# Patient Record
Sex: FEMALE | Race: WHITE
[De-identification: names, ages, dates, MRNs, and addresses within clinical notes are randomized per-mention and may not be internally consistent; named-entity substitution may affect disease eponyms.]

---

## 2018-12-28 ENCOUNTER — HOSPITAL ENCOUNTER (EMERGENCY)
Dept: HOSPITAL 80 - FED | Age: 68
Discharge: HOME | End: 2018-12-28
Payer: COMMERCIAL

## 2018-12-28 VITALS — SYSTOLIC BLOOD PRESSURE: 125 MMHG | DIASTOLIC BLOOD PRESSURE: 74 MMHG

## 2018-12-28 DIAGNOSIS — J44.1: Primary | ICD-10-CM

## 2018-12-28 DIAGNOSIS — C43.9: ICD-10-CM

## 2018-12-28 DIAGNOSIS — M79.89: ICD-10-CM

## 2018-12-28 NOTE — EDPHY
H & P


Stated Complaint: SOB "retaining fluid" x 3 days -sent from [facility]


Time Seen by Provider: 12/28/18 15:29





- Medical/Surgical History


Hx Asthma: No


Hx Chronic Respiratory Disease: No


Hx Diabetes: No


Hx Cardiac Disease: No


Hx Renal Disease: No


Hx Cirrhosis: No


Hx Alcoholism: No


Hx HIV/AIDS: No


Hx Splenectomy or Spleen Trauma: No


Other PMH: malignant melanoma, lymphadema.  on immunotherapy





- Social History


Smoking Status: Current every day smoker


Constitutional: 


 Initial Vital Signs











Temperature (C)  36.5 C   12/28/18 15:16


 


Heart Rate  79   12/28/18 15:16


 


Respiratory Rate  20   12/28/18 15:16


 


Blood Pressure  100/71   12/28/18 15:16


 


O2 Sat (%)  94   12/28/18 15:16








 











O2 Delivery Mode               Room Air














Allergies/Adverse Reactions: 


 





No Known Allergies Allergy (Unverified 12/28/18 15:14)


 








Home Medications: 














 Medication  Instructions  Recorded


 


Aspirin EC 81 mg (*)  12/28/18


 


Lasix 20 MG (*)  12/28/18


 


Potassium Citrate  12/28/18














Medical Decision Making





- Diagnostics


Imaging Results: 


 Imaging Impressions





Chest X-Ray  12/28/18 15:35


Impression:


1. No acute process.


2. Suspect underlying COPD and mild air trapping.











Imaging: Discussed imaging studies w/ On call Radiologist, I viewed and 

interpreted images myself


ED Course/Re-evaluation: 





CHIEF COMPLAINT: Shortness of breath 





HISTORY OF PRESENT ILLNESS:  


The patient is a 69 y/o female with a history of malignant melanoma (on 

immunotherapy) complaining shortness of breath and leg swelling. Around 1 month 

ago she developed the swelling in her extremities. Per the patient's son, for 

the past 2 weeks the patient has been getting "winded" and short of breath when 

moving short distances. Around the same time that she developed the shortness 

of breath she also began to feel more weak and began falling more. Due to this 

weakness she has used a walker. As her symptoms have not improved she went in 

to see her oncologist, Dr. Carmina Mcdaniels. During hat visit her BNP was 232 and she 

had a normal EKG, she did not have a troponin done at that time. Due to her 

symptoms she was advised to present to the emergency department. No fever, 

headache, chest pain, abdominal pain, urinary or bowel complaints, numbness.





REVIEW OF SYSTEMS:  





A comprehensive 10 system review of systems is otherwise negative aside from 

elements mentioned in the history of present illness and medical decision 

making.





PHYSICAL EXAM:  





HR, BP, O2 Sat, RR.  Temp noted


General Appearance:  Alert, well hydrated, appropriate, and non-toxic appearing.


Head:  Atraumatic without scalp tenderness or obvious injury


Eyes:  Pupils equal, round, reactive to light and accommodation, EOMI, no trauma

, no injection.


Ears:  Clear bilaterally, no perforation, normal landmarks


Nose:  Atraumatic, no rhinorrhea, clear.


Throat:  There is no erythema or exudates, no lesions, normal tonsils, mucus 

membranes moist.


Neck:  Supple, 2+ carotid upstroke, nontender, no lymphadenopathy.


Respiratory: Increased respiratory rate, decreased breath sounds bilaterally, 

bilateral coarse rhonchi. No wheezes.


Cardiovascular:  Regular rate and rhythm, no murmurs, rubs, or gallops. 

Bilateral carotid, radial, dorsalis pedis, and posterior tibial pulses intact. 

Good capillary refill all extremities.


Gastrointestinal:  Abdomen is soft, nontender, non-distended, no masses, no 

rebound, no guarding, no peritoneal signs.


Musculoskeletal: 2+ peripheral edema. Normal active ROM of all extremities, 

atraumatic.


Neurological:  Alert, appropriate, and interactive.  The patient has normal 

DTRs and non-focal cranial nerves, motor, sensory, and cerebellar exam.


Skin:  No rashes, good turgor, no nodules on palpation.





Past medical history: Malignant melanoma (on immunotherapy), lymphedema


Past surgical history: Denies


Family history: Denies


Social history: Son at bedside, lives in [facility]





DIAGNOSTICS/PROCEDURES/CRITICAL CARE TIME:  





Chest x-ray: No acute process. Suspect underlying COPD and mild air trapping.





Bilateral Lower Extremity US: Left groin fluid collection, but no DVT.





DIFFERENTIAL DIAGNOSIS:   


The differential diagnosis for the patient's shortness of breath and hypoxemia 

included but was not limited to pneumonia, myocardial infarction, acute 

mountain sickness, high altitude pulmonary edema, congestive heart failure, and 

pulmonary embolus.





MEDICAL DECISION MAKING:  


The patient is a 69 y/o female with a history of malignant melanoma (on 

immunotherapy) presenting with worsening shortness of breath, weakness, and leg 

swelling onset 2 weeks ago. She had a normal EKG at her oncologist (Dr. Carmina Mcdaniels) and a BNP of 232 today. On exam she has an increased respiratory rate, 

decreased breath sounds bilaterally, coarse rhonchi throughout, and 2+ 

peripheral edema. Labs and chest x-ray ordered.





1659: I reviewed patient's chest x-ray which reveals underlying COPD with mild 

air trapping. She has not indication of CHF based on her labs and imaging 

findings.





1711: Patient is most likely having a COPD exacerbation. She will need to 

follow up with her oncologist.





1725: I consulted with NICHELLE Saavedra regarding this patient. He would like to have 

bilateral leg US's performed.





1727: Reassessed patient and discussed laboratory and imaging findings. I have 

also discussed having the ultrasound's performed which she is comfortable with. 





1818: I consulted with Dr. Robbins, radiologist, regarding patient's US. There 

is a left groin fluid collection, but no DVT.





1820: Reassessed patient and discussed US findings. Return precautions provided

; patient is comfortable with this plan.








- Data Points


Laboratory Results: 


 











  12/28/18 12/28/18





  15:46 15:46


 


Magnesium    1.8 mg/dL mg/dL





    (1.6-2.3) 


 


POC Troponin I  0.00 ng/mL ng/mL  





   (0.00-0.08)  











Point of Care Test Results: 


 Chemistry











  12/28/18





  15:46


 


POC Troponin I  0.00 ng/mL ng/mL





   (0.00-0.08) 














Departure





- Departure


Disposition: Home, Routine, Self-Care


Clinical Impression: 


 Chronic obstructive pulmonary disease with acute exacerbation





Condition: Good


Instructions:  COPD (Chronic Obstructive Pulmonary Disease) (ED), Shortness of 

Breath (ED)


Additional Instructions: 


1. Follow-up with your primary doctor or oncologist within 72 hours.  


2. Return to the Emergency Department for fever, chest pain, shortness of breath

, increasing pain or other worsening of condition.


3. There was an incidental finding of a left groin fluid collection which you 

can follow up with your oncologist.





Referrals: 


Carmina Mcdaniels MD [Medical Doctor] - As per Instructions


Report Scribed for: Zackery Mckeon


Report Scribed by: Tsering Wang


Date of Report: 12/28/18


Time of Report: 15:30

## 2019-01-03 ENCOUNTER — HOSPITAL ENCOUNTER (INPATIENT)
Dept: HOSPITAL 80 - FED | Age: 69
LOS: 1 days | Discharge: ANOTHER HOSPITAL | DRG: 95 | End: 2019-01-04
Attending: INTERNAL MEDICINE | Admitting: INTERNAL MEDICINE
Payer: COMMERCIAL

## 2019-01-03 DIAGNOSIS — Z85.048: ICD-10-CM

## 2019-01-03 DIAGNOSIS — E87.1: ICD-10-CM

## 2019-01-03 DIAGNOSIS — Z85.820: ICD-10-CM

## 2019-01-03 DIAGNOSIS — F17.210: ICD-10-CM

## 2019-01-03 DIAGNOSIS — G61.0: Primary | ICD-10-CM

## 2019-01-03 LAB — PLATELET # BLD: 417 10^3/UL (ref 150–400)

## 2019-01-03 PROCEDURE — A9585 GADOBUTROL INJECTION: HCPCS

## 2019-01-03 NOTE — GHP
DATE OF ADMISSION:  01/03/2019



The patient is a lady with a history of malignant melanoma with metastases on immunotherapy.  This wa
s 1st diagnosed about 6 years ago.  She had about 6 weeks of lower extremity swelling and dyspnea. 



She was seen in the emergency department a few days ago, had ultrasound negative for DVT.  She re-pre
sents today with ongoing weakness.  A few days ago, she was hyponatremic at 128.  She has been on Las
ix.  She eats well. 



She has not had fever, chills, cough, sputum.  She describes her legs as weak and buckling.  There is
 no rash.  She has not had fevers.  She has a firm mass in her left upper inner thigh. 



She says as of 6 weeks ago, she was doing well, thriving, working at [employer], etc. 



I spent some time reviewing the recent chest, abdomen, pelvis CT scan with Radiology, which showed no
 lesions in her lungs, clear lungs.  Her liver looked slightly long, but otherwise unremarkable.  The
re is no intraabdominal fluid collection.  She had a stable seroma in her left inner thigh and there 
was no clear spinal lesion.



PAST MEDICAL HISTORY:  Metastatic melanoma on immunotherapy.



ALLERGIES:  No known drug allergies.



MEDICATIONS:  Furosemide, aspirin, ibuprofen, Mag oxide, potassium.



SOCIAL HISTORY:  She smokes cigarettes.  She drinks rare alcohol.  She works at [employer] in [facility].



FAMILY HISTORY:  Reviewed and unremarkable.



PHYSICAL EXAM:  VITAL SIGNS: Blood pressure 120/71, pulse 70, breathing 14 times a minute, 90% on ty
m air.  Temp 36.7.  GENERAL:  No acute distress. HEENT: Sclerae anicteric.  Oropharynx clear.  Mucous
 membranes moist.  NECK:  Supple without lymphadenopathy or JVD.  LUNGS:  Clear to auscultation bilat
erally.  HEART: S1, S2.  ABDOMEN:  Soft, nontender, nondistended.  LOWER EXTREMITIES:  There is a flu
ctuant mass without warmth or erythema in the left inner thigh.  There is 1+ edema.  NEUROLOGIC: Her 
strength is probably 3/5 in her lower extremities. A little bit weaker on the left.  SKIN: Without ra
sh.



LABS:  White count 14.5, left shift, hematocrit 42, platelets are 417,000.  Sodium 128, potassium 4.5
, chloride 94, bicarb 29, BUN 18, creatinine 0.7, glucose 115.  Troponin 5 days ago was 0.  Imaging s
tudies are as noted.  



Discussed the case with Dr. Joey Nash.



ASSESSMENT/PLAN:  A 68-year-old female with lower extremity edema and weakness.

1.  Edema.  This could be a result of immunotherapy.  She ruled out for deep vein thrombosis just a f
ew days ago.  Further workup will include echocardiogram and albumin.  She does not have ascites to s
uggest liver dysfunction is causing this and imaging of her liver is relatively unremarkable.  It may
 be a paraneoplastic syndrome as well.

2.  Echo and albumin and liver function tests.  I think we can hold off on repeating the ultrasound.

3.  Weakness, this is concerning for spinal pathology, her CT did not show anything or an MRI L-spine
 with contrast.  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  This is not particularly common with metastatic 
melanoma, but I will defer workup of this until the MRI is done.

4.  Hyponatremia.  I suspect this is secondary to Lasix versus syndrome of inappropriate antidiuretic
 hormone.  I will fluid restrict her 2 L and hold her Lasix.

5.  Melanoma.  Immunotherapy is on hold for now. 

6.  Hyponatremia.  This is euvolemic hyponatremia.  I will send a urine os and evaluate for syndrome 
of inappropriate antidiuretic hormone, although it will be a little bit difficult to sort out with th
e Lasix therapy.



DISPOSITION:  Inpatient status. Full code.  PT, OT.





Job #:  [id number]

## 2019-01-03 NOTE — GHP
DATE OF ADMISSION:  01/03/2019



The patient is a pleasant 68-year-old female with a history of metastatic melanoma, who presents with
 lower extremity edema and weakness. 



She has melanoma, which is currently 



DICTATION ENDS





Job #:  [id number]

## 2019-01-03 NOTE — EDPHY
H & P


Time Seen by Provider: 01/03/19 13:42


HPI/ROS: 





CHIEF COMPLAINT:  Bilateral leg weakness





HISTORY OF PRESENT ILLNESS:  Patient was in emergency department 12/28/2018 for 

chief complaint of shortness of breath and retaining fluid.  Diagnosis was COPD 

exacerbation with chest x-ray performed and bilateral ultrasound of legs 

negative for DVT.  She has been having increasing leg swelling and weakness for 

the past 6 weeks.





Over the past 3 days patient's weakness has gotten much worse.  She is falling 

every other day.  She can't walk.  She feels like she has no strength in her 

legs.  Not associated with incontinence, her numbness is also getting worse.  

No difficulty breathing.





REVIEW OF SYSTEMS:


Eye: no change in vision


ENT: no sore throat


Cardiac: no chest pain or syncope


Pulmonary: no cough or SOB


Abdomen: no vomiting, diarrhea, abdominal pain


Musculoskeletal: no back pain


Skin: no rash


Neuro:  HPI


Constitutional: no fever


: no urinary symptoms





A comprehensive 10 point review of systems is otherwise negative aside from 

elements mentioned in the history of present illness.





PAST MEDICAL HISTORY:  ED visit dated 12/28/2018 personally reviewed includes 

malignant melanoma, with lymphedema on immunotherapy.





Social history:  Tobacco smoker





General Appearance: Alert and conversant, cooperative.


Eyes: No scleral  icterus. 


ENT, Mouth: Normal mucous membranes.


Respiratory: Normal respiratory effort, breath sounds equal, lungs are clear to 

auscultation.


Cardiovascular:  Regular rate and rhythm.


Gastrointestinal:  Abdomen is soft and non tender.


Neurological:  Alert, face symmetric, has decreased sensation to both lower 

extremities, no clonus, toes downgoing.  She does not have detectable patellar 

reflexes.  She cannot lift either leg off the bed although she can plantar flex 

and dorsiflex at the ankle with good strength.


Skin: Warm and dry, no rashes.


Musculoskeletal:  There is some groin swelling and induration proximally on the 

left leg but no tenderness and no redness or lymphangitis.


Psychiatric: Not agitated.





Emergency Department course/MDM:





Admission for further evaluation in this patient with melanoma, who now can't 

walk.


Alvin considered; I think that DVT would be unlikely with recent 

negative ultrasound, think cauda equina would be unlikely without back pain and 

some preserved plantar and dorsiflexion with no incontinence.  Does not appear 

to have acute CHF or cellulitis or compartment syndrome or fracture.  Symmetric 

leg weakness would be unlikely to be ischemic or hemorrhagic stroke.  Think 

myelitis would be less likely with 6 week history of symptoms.





[facility] for Dr. Arthur 1401.


[facility] for Dr. Carmina Mcdaniels, Dr. Hargrove 1358.


Smoking Status: Heavy smoker


Constitutional: 


 Initial Vital Signs











Temperature (C)  36.7 C   01/03/19 13:18


 


Heart Rate  80   01/03/19 13:18


 


Respiratory Rate  18   01/03/19 13:18


 


Blood Pressure  99/50 L  01/03/19 13:18


 


O2 Sat (%)  93   01/03/19 13:18








 











O2 Delivery Mode               Room Air














Allergies/Adverse Reactions: 


 





No Known Allergies Allergy (Verified 01/03/19 14:15)


 








Home Medications: 














 Medication  Instructions  Recorded


 


Aspirin [Aspirin 81mg (*)] 81 mg PO DAILY 12/28/18


 


Furosemide [Lasix 40 MG (*)] 40 mg PO DAILY 12/28/18


 


Potassium Cl [Klor-Con 20 meq (*)] 20 meq PO DAILY 12/28/18


 


Ibuprofen [Motrin (*)] 400 mg PO DAILY PRN 01/03/19


 


Magnesium Oxide [Magnesium Oxide 400 mg PO DAILY 01/03/19





400 mg (*)]  














Medical Decision Making





- Data Points


Laboratory Results: 


 Laboratory Results





 01/03/19 13:45 





 01/03/19 13:45 





 











  01/03/19 01/03/19





  13:45 13:45


 


WBC    14.52 10^3/uL H 10^3/uL





    (3.80-9.50) 


 


RBC    4.90 10^6/uL 10^6/uL





    (4.18-5.33) 


 


Hgb    14.5 g/dL g/dL





    (12.6-16.3) 


 


Hct    42.2 % %





    (38.0-47.0) 


 


MCV    86.1 fL fL





    (81.5-99.8) 


 


MCH    29.6 pg pg





    (27.9-34.1) 


 


MCHC    34.4 g/dL g/dL





    (32.4-36.7) 


 


RDW    14.0 % %





    (11.5-15.2) 


 


Plt Count    417 10^3/uL H 10^3/uL





    (150-400) 


 


MPV    8.4 fL L fL





    (8.7-11.7) 


 


Neut % (Auto)    Not Reported 





   


 


Lymph % (Auto)    Not Reported 





   


 


Mono % (Auto)    Not Reported 





   


 


Eos % (Auto)    Not Reported 





   


 


Baso % (Auto)    Not Reported 





   


 


Nucleat RBC Rel Count    Not Reported 





   


 


Absolute Neuts (auto)    Not Reported 





   


 


Absolute Lymphs (auto)    Not Reported 





   


 


Absolute Monos (auto)    Not Reported 





   


 


Absolute Eos (auto)    Not Reported 





   


 


Absolute Basos (auto)    Not Reported 





   


 


Absolute Nucleated RBC    Not Reported 





   


 


Immature Gran %    Not Reported 





   


 


Seg Neutrophils %    64.7 % %





   


 


Band Neutrophils %    2.0 % %





   


 


Lymphocytes %    2.9 % %





   


 


Monocytes %    4.9 % %





   


 


Eosinophils %    24.5 % %





   


 


Basophils %    1.0 % %





   


 


Metamyelocytes %    0.0 % %





   


 


Myelocytes %    0.0 % %





   


 


Promyelocytes %    0.0 % %





   


 


Blast Cells %    0.0 % %





   


 


Immature Gran #    Not Reported 





   


 


Absolute Seg Neuts    9.39 10^3/uL H 10^3/uL





    (1.70-6.50) 


 


Absolute Band Neuts    0.29 10^3/uL 10^3/uL





    (0.00-0.70) 


 


Absolute Lymphocytes    0.42 10^3/uL L 10^3/uL





    (1.00-3.00) 


 


Absolute Monocytes    0.71 10^3/uL 10^3/uL





    (0.30-0.80) 


 


Absolute Eosinophils    3.56 10^3/uL H 10^3/uL





    (0.03-0.40) 


 


Absolute Basophils    0.15 10^3/uL H 10^3/uL





    (0.02-0.10) 


 


Absolute Metamyelocyte    0.00 10^3/mL 10^3/mL





    (0.00-0.00) 


 


Absolute Myelocytes    0.00 10^3/mL 10^3/mL





    (0.00-0.00) 


 


Absolute Promyelocytes    0.00 10^3/uL 10^3/uL





    (0.00-0.00) 


 


Absolute Plasma Cells    0.00 10^3/uL 10^3/uL





    (0.00-0.00) 


 


Nucleated RBCs    0 /100 WBC /100 WBC





    (0-0) 


 


Absolute Blast Cells    0.00 10^3/uL 10^3/uL





    (0.00-0.00) 


 


Plasma Cells %    0.0 % %





   


 


Platelet Estimate    INCREASED  H 





    (ADEQ) 


 


Microcytic Cells    1+  H 





   


 


Sodium  128 mEq/L L mEq/L  





   (135-145)  


 


Potassium  4.5 mEq/L mEq/L  





   (3.5-5.2)  


 


Chloride  94 mEq/L L mEq/L  





   ()  


 


Carbon Dioxide  29 mEq/l mEq/l  





   (22-31)  


 


Anion Gap  5 mEq/L L mEq/L  





   (6-14)  


 


BUN  18 mg/dL mg/dL  





   (7-23)  


 


Creatinine  0.7 mg/dL mg/dL  





   (0.6-1.0)  


 


Estimated GFR  > 60   





   


 


Glucose  115 mg/dL H mg/dL  





   ()  


 


Calcium  9.2 mg/dL mg/dL  





   (8.5-10.4)  














Departure





- Departure


Disposition: [facility] Inpatient Acute


Clinical Impression: 


 Bilateral leg weakness





Condition: Fair

## 2019-01-03 NOTE — ASMTCMCOM
CM Note

 

CM Note                       

Notes:

Chart reviewed. 68 year old female admitted via ED for c/o inability to walk. Normally lives 

independently in [place] with a son who lives nearby. Currently being treated for malignant 

melanoma CM to follow for needs.



Plan: TBD

 

Date Signed:  01/03/2019 03:44 PM

Electronically Signed By:Karen Kwon RN

## 2019-01-04 VITALS — SYSTOLIC BLOOD PRESSURE: 106 MMHG | DIASTOLIC BLOOD PRESSURE: 66 MMHG

## 2019-01-04 LAB
CK SERPL-CCNC: 54 IU/L (ref 0–156)
PLATELET # BLD: 393 10^3/UL (ref 150–400)

## 2019-01-04 NOTE — NEUROPROG
Assessment: 


Epi_1950


  -


Neurology Consult:


-


CC: weakness


-


HPI: 


Pt with metastatic melanoma on immunotherapy (keytruda) admitted to [facility] on 1/3/

19 for bilateral leg swelling and leg weakness.  Pt reported for past six weeks 

she has had progressive leg weakness to the point of inability to walk.  She 

had a lumbar MRI wwo which was unremarkable.  Her neurologic exam showed 

bilateral leg weakness as well as absent reflexes in legs.  This is concerning 

for some form of acute inflammatory demyelinating polyneuropathy.  I feel the 

next diagnostic test she needs is EMG/NCS of legs to look for any acquired 

conduction block.  This test is not available for inpatients at [facility] so I will 

transfer to [facility] for further diagnostic w/u.  Pt in agreement 

with plan.  Case discussed as well with hospitalist.


-


PMHx: metastatic melanoma on immunotherapy


-


SHx: +tobacco


FHx: NC


-


ROS: Pt denied acute fever, total vision loss, active severe chest pain, 

respiratory failure, total body severe rash, total bowel/bladder incontinence, 

psychosis, active seizures, or active bleeding


-


O:


VS reviewed


General: Alert


Eyes: Fundoscopic exam not able to visualize optic disks


CV: Heart RRR, no murmur, no carotid bruit


Lungs: Clear to auscultation bilaterally, no rhonchi or rales


Neuro:


- Mental: 


.     Oriented x person/place/date


.     concentration appears normal


.     speech fluency/comprehension normal


.     memory appears normal


.     fund of knowledge appear intact


- Cranial Nerves:


.     II: PERRL, VFFTC


.     III/IV/VI: EOMI, no nystagmus, normal smooth pursuits, no Ptosis


.     V: facial sensation intact to LT


.     VII: face symmetric to eye closure and smile


.     VIII: hearing intact to conversation


.     IX/X: uvula raises symmetrically


.     XI: SCM 5/5 B/L strength


.     XII: tongue protrudes midline w/nl strength


- Motor: 


.     Tone: normal tone in all 4 extremity


.     Strength: no pronator drift, strength 5/5 throughout arms but bilateral 

leg weakness


- Reflexes: B/L patella/ach 0/4


- Sensory: all 4 extremity intact to light touch


- Coord: finger-to-nose wnl, BONITA wnl


- Gait: deferred


-


Labs:


1/4/19- K 4.4


-


Rads:


1/3/19- Lumbar MRI wwo: Negative MRI examination of the lumbar spine without 

and with contrast (I personally visualized the images on 1/4/19)


-


Assessment:


1. B/L Leg Weakness: Pt with metastatic melanoma on immunotherapy (keytruda) 

admitted to [facility] on 1/3/19 for bilateral leg swelling and leg weakness.  Pt 

reported for past six weeks she has had progressive leg weakness to the point 

of inability to walk.  She had a lumbar MRI wwo which was unremarkable.  Her 

neurologic exam showed bilateral leg weakness as well as absent reflexes in 

legs.  This is concerning for some form of acute inflammatory demyelinating 

polyneuropathy (AIDP or Guillian Braddock Syndrome).  I feel the next diagnostic 

test she needs is EMG/NCS of legs to look for any acquired conduction block.  

This test is not available for inpatients at [facility] so I will transfer to [facility] for further diagnostic w/u.  Pt in agreement with plan.  Case 

discussed as well with hospitalist.


-


Plan:


- Transfer to [facility] for inpatient EMG/NCS and treatment of 

probable AIDP





Objective: 





 Vital Signs











Temp Pulse Resp BP Pulse Ox


 


 36.6 C   79   16   106/66   92 


 


 01/04/19 16:00  01/04/19 16:00  01/04/19 12:42  01/04/19 16:00  01/04/19 16:00








 Laboratory Results





 01/04/19 04:53 





 01/04/19 04:53 





 











 01/03/19 01/04/19 01/05/19





 05:59 05:59 05:59


 


Intake Total  880 500


 


Output Total  1170 1650


 


Balance  -290 -1150











Allergies/Adverse Reactions: 


 





No Known Allergies Allergy (Verified 01/03/19 14:15)

## 2019-01-04 NOTE — PDCONSULT
Consultant Note: 


Patient is a 68-year-old female with a history of stage IIIa (T2AN1A) melanoma 

of the left calf currently on adjuvant nivolumab admitted for lower extremity 

swelling sensory changes weakness.





Patient reports 6 weeks of progressively worsening lower extremity swelling 

paresthesias and weakness.  Appears that the weakness is more pronounced in the 

proximal lower extremities.  Much of her evaluation has centered around the 

lower extremity edema including a negative venous duplex ultrasound.He also had 

an echocardiogram on 1/3/2018 was unremarkable.  On admission to the hospital 

directly from the clinic she had a lumbar spine MRI which was also 

unremarkable. She denies saddle anesthesia. 





She received her 13th cycle of nivolumab on 2018.








Past Medical History:


- Anal Cancer status post Jamir protocol in 


- Melanoma, 1.1 mm 2mm2 mitoses, 1 LN involved 





Past Surgical History:


-Wide local excision with SLN biopsy





Social History:


-50 pack year smoking history, occasional beer, no drugs or marijuana





Family History:


-Father  of colon cancer





Review of Systems: 


-A complete 12 point ROS is negative unless indicated in the HPI





Physical Examination:


VS reviewed


General: no acute distress, non toxic appearing female


HEENT: pupil are equal round and reactive to light, no icterus or pallor


Neck: supple


Cardiovascular: Regular in rate and rhythm without rubs thrills or gallops


Chest: Clear to auscultation and percussion in bilateral posterior lungs


Abdomen: soft nontender non distended without hepatosplenomegaly


Neurologic: 5/5 UE strength bilaterally, CN II-XII intact, LE: 3/5 flexion at 

hips, 4-/5 dorsiflexion, 5/5 plantar flexion at ankles, decreased sensation 

throughtout LEs to light touch most pronounced inner thighs bilaterally, 

proprioception intact LEs, absent patellar and ankle jerks, down going toes to 

babinski, no clonus 





Medications and Allergies Reviewed in MAR





Assessment and Plan:


Patient is a 68-year-old female with history of stage III melanoma of the lower 

extremity admitted for weakness in the lower extremities accompanied by 

paresthesias and edema concerning for a demyelinating process such as Guillain-

Barr.





Problem #1 - lower extremity weakness and sensory changes


Patient's current constellation of symptoms as well as physical examination 

concerning for an acute demyelinating polyneuropathy.  He has both weakness and 

decreased sensation in the lower extremities as well as being hyporeflexic of 

the patella and ankles.  Given that this is likely related to immunotherapy a 

consequence of immunotherapy treatment plan would be to stop immunotherapy 

immediately we will give the patient 1 g of methylprednisolone IV now have her 

evaluated by neurology with consideration of IVIG and plasmapheresis

## 2019-01-04 NOTE — ASMTCMCOM
CM Note

 

CM Note                       

Notes:

Per neurologist Dr. Elizondo, patient to transfer to [facility] for workup for her neurological symptoms 

Charge Rn aware, [place] to call. Dr Elizondo spoke with accepting physician. Dr. Moreno to do [facility] 

form. Nurse also aware of [facility] form. Patient in agreement for transfer.

 

Date Signed:  01/04/2019 05:11 PM

Electronically Signed By:Karen Kwon RN

## 2019-01-04 NOTE — HOSPPROG
Hospitalist Progress Note


Objective: 


 Vital Signs











Temp Pulse Resp BP Pulse Ox


 


 36.6 C   65   16   114/67   97 


 


 01/04/19 12:42  01/04/19 12:42  01/04/19 12:42  01/04/19 12:42  01/04/19 12:42








 Laboratory Results





 01/04/19 04:53 





 01/04/19 04:53 





 











 01/03/19 01/04/19 01/05/19





 05:59 05:59 05:59


 


Intake Total  880 500


 


Output Total  1170 1350


 


Balance  -290 -850














ICD10 Worksheet


Patient Problems: 


 Problems











Problem Status Onset


 


Bilateral leg weakness Acute

## 2019-01-04 NOTE — PDDCSUM
Discharge Summary


Discharge Summary: 





Date of Admission:  January 3, 2019





Date of Transfer:  January 4, 2018





Transfer Diagnoses: 


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


   -b/l leg ascending weakness/paresthesias


St IV melanoma, on immunotherapy


Suspected Medication AE


Peripheral edema - b/l legs


Hypotonic hyponatremia, mild





Admission Diagnoses: 


Edema


Weakness


Hyponatremia


Melanoma





Consultants:


Oncology - Dr. José Hargrove.


Neurology - Dr. Kendall Elizondo.





Hospital Course: 


The pt is a 69yo F w/ PMH St IV melanoma who presented with progressively 

worsening bilateral leg weakness, gait instability, mechanical falls, leg 

swelling, and leg paresthesias. Initial onset of symptoms was nearly 6 weeks ago

, prior to which she reports normal activity/sensation. Symptoms greatly 

worsened within the last 1-2 weeks. She had complained about her symptoms to 

her Oncologist in the outpatient setting, who started her on daily Lasix/

potassium replacement. She had come to the ED 2 days prior to this admission 

and underwent a bilateral LE US which ruled out DVTs. She currently reports 

complete numbness in the distal LLE starting just above the knee and 70% 

numbness in RLE. She has had tingling paresthesias in bilateral feet. 





She underwent some initial workup (see tests below) and both Oncology and 

Neurology were consulted. Patient's symptoms are highly suspicious for a 

peripheral demyelinating syndrome such as Guillain Hartwick syndrome, perhaps 

secondary to immunotherapy or paraneoplastic syndrome. Unfortunately this 

hospital is unable to perform inpatient EMG/NCS testing and has other limited 

resources to care for this type of case. The patient has been accepted for 

transfer to [facility] in [place], were appropriate testing can 

be performed and patient can be followed by more specialized physicians. 





Patient was also noted to be mildly hyponatremic. Initial workup suggests 

hypotonic hyponatremia, perhaps secondary to furosemide use, SIADH associated 

with malignancy/medication AE, or adrenal insufficiency. Furosemide was held. 








Physical Exam: 


General: The patient is a female who is alert and in no acute distress. 


HEENT: normocephalic, extraocular movements intact, conjunctivae clear, no 

lesions on face. Mucous membranes moist.


Neck: trachea midline, no visible masses, no external lesions. 


CV: +S1/S2, RRR, no MRG.


Resp: unlabored, CTAB no RRW.


Abd: soft and nondistended.  


Musculoskeletal:  2/5 strength b/l hip flexors, knee flexion/extension. 3/5 

strength b/l foot ext/flexion. 


Neuro: cranial nerves II - XII grossly intact. 0 (absent) DTR b/l patellar/

Achilles. No clonus. Unable to perform heel to shin test b/l. 


Psych: appropriate mood/affect. 


Skin:  No pallor.


Heme/lymph:  No peripheral edema.





Condition: 


Guarded. Stable for transfer.  





Transferred to: 


[facility] in [facility]





Pertinent tests/labs/imaging:





Bilat LE US: negative for DVT. 


MRI L spine w/ and w/o contrast: normal. 


Labs: see attached. Pending vitamin B12 level, ACTH level. 


Echo: normal, aside from a small RA mass vs likely lipomatous interatrial 

septum. Consider cardiac MRI for better visualization. 





Medications:  


Furosemide 40 mg orally daily, aspirin 81 mg orally daily, ibuprofen 400 mg 

orally daily as needed for pain, magnesium oxide 400 mg orally daily, potassium 

chloride 20 mEq orally daily. Immunotherapy: Keytruda. 





Special instructions: 


Please communicate with the patient's primary Oncologist at the [facility] regarding her condition.





> 30 minutes of total time was spent on counseling and coordination of care for 

this patient's transfer.

## 2019-01-04 NOTE — ASMTCMCOM
CM Note

 

CM Note                       

Notes:

Patient plan of care reviewed in rounds,. 68 year old female living independently and working up 

until 6 weeks ago presents with increasing weakness of her lower extremitites. History significant 

for melanoma. Has supportive son in area. Needs TBD.

plan: TBD

 

Date Signed:  01/04/2019 02:28 PM

Electronically Signed By:Karen Kwon RN

## 2019-01-14 ENCOUNTER — HOSPITAL ENCOUNTER (INPATIENT)
Dept: HOSPITAL 80 - BREH | Age: 69
LOS: 11 days | Discharge: NURSING, REHAB OR LONG-TERM CARE | DRG: 74 | End: 2019-01-25
Attending: INTERNAL MEDICINE | Admitting: INTERNAL MEDICINE
Payer: COMMERCIAL

## 2019-01-14 DIAGNOSIS — R03.0: ICD-10-CM

## 2019-01-14 DIAGNOSIS — I89.0: ICD-10-CM

## 2019-01-14 DIAGNOSIS — R63.4: ICD-10-CM

## 2019-01-14 DIAGNOSIS — Z85.820: ICD-10-CM

## 2019-01-14 DIAGNOSIS — G62.9: Primary | ICD-10-CM

## 2019-01-14 DIAGNOSIS — M62.50: ICD-10-CM

## 2019-01-15 PROCEDURE — F0636ZZ COMMUNICATIVE/COGNITIVE INTEGRATION SKILLS TREATMENT OF NEUROLOGICAL SYSTEM - WHOLE BODY: ICD-10-PCS | Performed by: INTERNAL MEDICINE

## 2019-01-15 PROCEDURE — F07M3ZZ MOTOR FUNCTION TREATMENT OF MUSCULOSKELETAL SYSTEM - WHOLE BODY: ICD-10-PCS | Performed by: INTERNAL MEDICINE

## 2019-01-15 RX ADMIN — ACETAMINOPHEN PRN MG: 325 TABLET ORAL at 21:25

## 2019-01-15 RX ADMIN — ENOXAPARIN SODIUM SCH MG: 100 INJECTION SUBCUTANEOUS at 18:07

## 2019-01-15 NOTE — GHP
DATE OF ADMISSION:  01/15/2019



TIME OF EVALUATION:  1410



REFERRING FACILITY:  [facility].



IMPAIRMENT GROUP:  3.3.



DATE OF ONSET:  01/04/2019



POST ADMISSION PHYSICIAN EVALUATION AND REHABILITATION TREATMENT PLAN



CONSULTING PHYSICIANS:  She had consultation with Neurology, Dr. Chen, and was 
also seen by Oncology.



REHABILITATION DIAGNOSIS:  Debility due to polyneuropathy and leg weakness.



ETIOLOGIC DIAGNOSIS:  Polyneuropathy.



HISTORY OF PRESENT ILLNESS:  This patient was initially admitted to [facility] with rapid progression of weakness and numbness of the lower 
extremities over approximately a 2-week period.  She had noted symptoms 
beginning with sensory changes in her feet about a month prior to the faster 
exacerbation of symptoms.  At [facility], she had evaluation 
with labs which showed mild hyponatremia.  TSH was normal.  Cortisol was 
normal.  Vitamin B12 was on the low end of normal.  CBC was overall normal.  
She had imaging with a lumbar spine MRI, which was unremarkable.  
Echocardiogram was done, with no significant abnormalities.  Because [facility] could not perform EMGs and nerve conduction studies, she was 
transferred to [facility] on 01/04/2019.  There, further lab workup 
revealed resolution of the hyponatremia.  EMG studies showed both sensory and 
motor dysfunction, which seemed more axonal than demyelinating.  She was 
treated with IVIG for 5 days and continued on oral prednisone.  She had 
received 1 g of methylprednisolone IV at [facility].  She has 
had improvement in sensation and has been participating in therapies and is 
appropriate for inpatient rehabilitation.  



Other labs and studies during her hospitalization:  There was a brain MRI, 
which showed a small thalamic lesion which is suspicious for possible 
metastatic disease.  She had a lumbar puncture and CSF composition was overall 
normal with a mildly elevated glucose at 90.  A demyelinating neuropathy panel 
was sent to an outside laboratory, which was canceled because there was no 
contract with the hospital.



PRECAUTIONS:  She is a fall risk.



ACTIVE COMORBIDITIES:  There are no active tier 1, tier 2 or tier 3 
comorbidities.



PAST MEDICAL HISTORY:  

1.  Anal carcinoma, status post resection.

2.  Melanoma, stage IV, with metastasis to left groin lymph node, status post 
resection of the lymph node.

3.  Chronic lymphedema on the left.



PAST SURGICAL HISTORY:  

1.  She had resection of an anal cancer in 2012.

2.  Melanoma resection in the left thigh.

3.  Left femoral lymph node resection in 05/2018.

4.  Hysterectomy.



PREHOSPITAL MEDICATIONS:  

1.  Aspirin 81 mg p.o. q. day.

2.  Furosemide 40 mg p.o. q. day.

3.  Potassium chloride 20 mEq p.o. q. day.

4.  Ibuprofen 400 mg p.o. q. day p.r.n.

5.  Magnesium oxide 400 mg p.o. q. day.



ADMISSION MEDICATIONS:  

1.  Acetaminophen 650 mg p.o. q.6 hours p.r.n.

2.  Bisacodyl 10 mg IA b.i.d. p.r.n.

3.  Enoxaparin 40 mg subcutaneous q. day at 1800.

4.  Ondansetron 4 mg p.o. q.6 hours p.r.n.

5.  Oxycodone 5-10 mg p.o. q.4 hours p.r.n.

6.  Polyethylene glycol 17 g p.o. q. day p.r.n.

7.  Prednisone 60 mg p.o. q.day.



ALLERGIES:  There are no known drug allergies; however, her polyneuropathy may 
be a reaction to nivolumab, which she was taking to treat her metastatic 
melanoma.



SOCIAL HISTORY:  She is .  She lives by herself.  Her son lives 
approximately a mile away.  She works at [employer].  She is a smoker for 55 
years.  She drinks an occasional beer and denies any use of any other drugs.  
She was functionally fully independent prior to the onset of these symptoms.



FAMILY HISTORY:  Her mother had asthma.  Her father had colon cancer.



REVIEW OF SYSTEMS:  She reports tingling sensations and some pain in her 
calves.  Tingling sensations are also present in her feet and otherwise she has 
had return of tactile sense.  Besides that, she denies pain.  Her sleep at 
night has been inconsistent.  She did not sleep well last night.  She fell 
asleep well but then she woke up to urinate and was up for a while after that.  
She had poor tolerance of some kind of inflatable device that was on her feet 
and ankles because she prefers to sleep on her side and was unable to do that 
with these devices.  She reports weight loss and muscle wasting, especially in 
the legs, as a result of her illness over the past especially 2 weeks  prior to 
her admission.  She has some return of movement to her legs.  She denies 
dyspnea.  She has a chronic "smoker's" cough.  She denies chest pain or 
palpitations.  She denies nausea, vomiting, constipation or diarrhea.  She has 
a good appetite.  She has no urinary frequency or dysuria or urinary 
incontinence.  She denies skin rash or skin breakdown, though she reports that 
she has soreness over her sacrum.  Otherwise, a 10-point review of systems is 
negative.



PHYSICAL EXAM:  VITALS:  Blood pressure is 147/85, heart rate is 59, 
respiratory rate is 17, temperature is 36.5 degrees centigrade.  GENERAL:  This 
is a thin woman, appears her chronologic age, in bed, in street clothes, 
cooperative and in no acute distress.  HEENT:  Extraocular movements are 
intact.  Pupils are equal, round, and reactive to light.  Mucous membranes are 
moist.  Dentition is in good condition.  She has an uncrowded airway, 
Mallampati class I.  NECK:  Supple.  HEART:  There is a regular rate and rhythm 
with no murmurs, rubs or gallops.  LUNGS:  Clear to auscultation bilaterally 
with somewhat reduced breath sounds.  ABDOMEN:  Soft, nontender, nondistended, 
with normoactive bowel sounds and no hepatosplenomegaly.  EXTREMITIES:  There 
is no cyanosis or clubbing.  There is 1+ pitting edema bilaterally pretibial.  
NEUROLOGIC:  She is alert and oriented x3.  Cranial nerves 2-12 are grossly 
intact.  Upper extremities have normal strength.  Lower extremities bilaterally 
have 3+ to 4 strength at the hip flexors, 0 to 1 strength at the quadriceps, 3/
5 strength at the hamstrings, and foot plantar and dorsiflexion and great toe 
dorsiflexion have normal strength bilaterally.  Deep tendon reflexes are 2+ 
bilaterally at the biceps, patellar and Achilles tendons.



CURRENT LEVEL OF FUNCTION:  Per the preadmission screen, she was on a regular 
diet and she was independent with feeding.  Grooming required contact guard 
assist.  Dressing the upper body was done with modified independence and lower 
body dressing required moderate assist.  Toileting required supervision and 
setup.  Bed mobility required standby assist with the head of the bed elevated.
  It took increased time and she used her upper extremities to assist the lower 
body off the edge of the bed.  Transfers were done with contact guard to a front
-wheeled walker.  Dynamic standing balance required minimal assist.  She had 
poor endurance with decreased lower body strength.  She was able to ambulate 
with a front-wheeled walker with contact guard to minimal assist.  Cognition 
was normal. 



On the current exam, there are no significant changes from the preadmission 
screen.



IMPRESSION:  This is a 68-year-old woman with metastatic melanoma, who was 
taking nivolumab and had symptoms of neuropathy developing approximately 6 
weeks prior to her presentation.  These worsened in the 2 weeks prior to 
hospitalization.  She was diagnosed with a polyneuropathy, which was likely 
more axonal than demyelinating.  Neurologist note comments that the nivolumab 
is associated in case reports with acute inflammatory and chronic inflammatory 
demyelinating polyneuropathy.  She was initially treated with 1 g of 
methylprednisolone while at [facility].  Then, she was 
transferred to [facility], where nerve conduction and EMG studies 
could be done.  She was treated with 5 days of IVIG and she has subsequently 
had improvement in sensation and motor strength in her lower extremities.  The 
nivolumab has been discontinued.  The etiology of her neuropathy was also 
considered possibly due to a paraneoplastic syndrome.  Specific labs requested 
by Neurology regarding the polyneuropathy could not be done due to contract 
issues with the outside lab and these probably do not need to be done as long 
as she continues to improve.  She was participating in therapies and 
appropriate for inpatient rehabilitation. 



Her goal is to complete a rehabilitation stay and return home with assist from 
her son.  For a safe discharge, she will need to achieve modified independence 
for all activities of daily living and functional activities.  She will need to 
be able to negotiate 1 full flight of stairs with modified independence.  She 
will need to demonstrate independent use of compensatory strategies to be safe 
at home. 



She will have therapy with physical therapy and occupational therapy for 90 
minutes per day for each discipline on 5-7 days of the week.  Her expected 
duration of stay is 10-14 days. 



It is expected that upon discharge she will continue to benefit from home 
health services including nursing, a nurse's aide, social work, PT and OT.



PLAN:  

1.  Polyneuropathy with lower extremity weakness, status post treatment with 
IVIG and improvement.  It is encouraging that she has had return of strength as 
well as full return of reflexes.  Reviewing notes from her 2 hospitalizations, 
she was found to be areflexic.  PT and OT to optimize mobility and activities 
of daily living.

2.  Weight loss with muscle wasting.  There will be a dietary consult to 
optimize her nutrition as she works with therapies to recover her strength and 
function.

3.  Lymphedema.  She has 1+ pitting edema bilaterally to the lower extremities.
  Query whether this was the reason that she was treated with furosemide 
previously.  Echocardiogram ruled out congestive heart failure as a contributor 
to the lymphedema.  As she begins to spend more time upright, she will be 
monitored for recurrence.  Will consider NEISHA hose.

4.  Elevated blood pressure on admission to inpatient rehabilitation.  Will 
follow her vitals and consider treatment if blood pressure remains 
significantly elevated.  Blood pressure may be elevated due to prednisone 
treatment.  Prednisone is to continue until follow-up with Neurology after 
discharge.

5.  History of melanoma.  No longer on nivolumab.  She will follow up with her 
primary oncologist, Dr. Carmina Mcdaniels.

6.  Prophylaxis.  With a solid cancer and immobility and age, she is at high 
risk for venous thrombosis.  Continue enoxaparin and SCDs at night.  Will 
discuss the need for SCDs if she is unable to tolerate them and sleep.

7.  Followup.  She will follow up with her primary oncologist, Dr. Carmina Mcdaniels, 
and she will follow up with neurologist, Dr. Greene with [facility], 
after her discharge.





Job #:  [id number]

MTDD

## 2019-01-16 RX ADMIN — ENOXAPARIN SODIUM SCH MG: 100 INJECTION SUBCUTANEOUS at 17:54

## 2019-01-16 RX ADMIN — ACETAMINOPHEN PRN MG: 325 TABLET ORAL at 19:41

## 2019-01-16 NOTE — SOAPPROG
SOAP Progress Note


Assessment/Plan: 


Assessment:





Polyneuropathy with lower extremity weakness, status post treatment with IVIG 

and improvement.  It is encouraging that she has had some return of strength as 

well as full return of reflexes.  Reviewing notes from her 2 hospitalizations, 

she was found to be areflexic.  


* PT and OT to optimize mobility and activities of daily living.





Weight loss with muscle wasting.  There will be a dietary consult to optimize 

her nutrition as she works with therapies to recover her strength and function.





Sleep issues.  Hope for a more comfortable room environment tonight, 1/16/2019.

  Will not initiate any medications at present.





Lymphedema.  She has 1+ pitting edema bilaterally to the lower extremities.  

Query whether this was the reason that she was treated with furosemide 

previously.  Echocardiogram ruled out congestive heart failure as a contributor 

to the lymphedema.  As she begins to spend more time upright, she will be 

monitored for recurrence.  Will consider NEISHA hose.





Elevated blood pressure on admission to inpatient rehabilitation.  Will follow 

her vitals and consider treatment if blood pressure remains significantly 

elevated.  Blood pressure may be elevated due to prednisone treatment.  

Prednisone is to continue until follow-up with Neurology after discharge.





History of melanoma.  No longer on nivolumab.  She will follow up with her 

primary oncologist, Dr. Carmina Mcdaniels.





Prophylaxis.  With a solid cancer and immobility and age, she is at high risk 

for venous thrombosis.  Continue enoxaparin and SCDs at night.  Will discuss 

the need for SCDs if she is unable to tolerate them and sleep.





Followup.  She will follow up with her primary oncologist, Dr. Carmina Mcdaniels, and 

she will follow up with neurologist, Dr. Greene with [facility], after 

her discharge.








01/16/19 13:56





Subjective: 





Poor sleep overnight.  She reports the room was too hot.  She also did not like 

the sequential compression devices.  Otherwise she is without complaints.  No 

cough or dyspnea, no fevers or chills.  She felt a new pain in her left ankle 

which she welcomed as a sign of returning sensation.  This happened after 

physical therapy.


Objective: 





 Vital Signs











Temp Pulse Resp BP Pulse Ox


 


 36.6 C   66   15   140/83 H  92 


 


 01/16/19 07:16  01/16/19 07:16  01/16/19 07:16  01/16/19 07:16  01/16/19 07:16








 











 01/15/19 01/16/19 01/17/19





 05:59 05:59 05:59


 


Intake Total  640 360


 


Output Total  650 


 


Balance  -10 360














Physical Exam





- Physical Exam


General Appearance: WD/WN, alert, no apparent distress, thin


Respiratory: No respiratory distress, No accessory muscle use


Skin: normal color, warm/dry


Neuro/Psych: alert, normal mood/affect, oriented x 3





ICD10 Worksheet


Patient Problems: 


 Problems











Problem Status Onset


 


Bilateral leg weakness Acute

## 2019-01-16 NOTE — PDOREHIP
Admission [facility]





- Admission - 3 Day Assessment Period


Admission Date/Day 1: 01/15/19


Day 2: 01/16/19


Day 3: 01/17/19





- Active Diagnoses


Comorbidities and Co-existing Conditions at Admission: 36114. None of the Above





- Skin Conditions


Unhealed Pressure Ulcer (1 or more/Stage 1 or >)-Admission: 0. No


# Stage 1 Pressure Ulcers-Admission: 0


# Stage 2 Pressure Ulcers-Admission: 0


# Stage 3 Pressure Ulcers-Admission: 0


# Stage 4 Pressure Ulcers-Admission: 0


# Unstageable Pressure Ulcers (Non-remove Dress)-Admission: 0


# Unstageable Pressure Ulcers (Slough/Eschar)-Admission: 0


# Unstageable Pressure Ulcers (Deep Tissue Injury)-Admission: 0

## 2019-01-17 RX ADMIN — ACETAMINOPHEN PRN MG: 325 TABLET ORAL at 20:06

## 2019-01-17 RX ADMIN — ENOXAPARIN SODIUM SCH MG: 100 INJECTION SUBCUTANEOUS at 16:54

## 2019-01-17 NOTE — SOAPPROG
SOAP Progress Note


Assessment/Plan: 


68-year-old woman with acute axonal polyneuropathy of unclear etiology but 

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 

improvement, impairments in mobility, self-care, decreased strength and 

sensation.





Today's update:  Worsened neuropathic pain in lower extremities overnight 

likely related to neural recovery rather than worsening of her condition given 

subjective improvements in her sensation and improvements in strength over 

time.  Discussed at length the pros and cons of medication versus non 

pharmacologic management of pain, patient elected to have gabapentin available 

as needed at a low dose, also will work on desensitization exercises.  She also 

desires that the oxycodone be removed from her medication list, we will 

accommodate.  Continue the rehabilitation plan, she seems to be doing well.





Additional issues reviewed without change today include lymphedema, blood 

pressure which is slightly high but relatively well controlled, history of 

melanoma, prophylaxis.





A total of 25 min was spent on the floor in the care of the patient, the 

majority of which was spent in counseling coordination of care regarding pain 

management strategies and coordination of rehabilitation activities.





01/17/19 09:11





Subjective: 





Chief complaint:  Worsening leg pain





Overnight patient endorsed that she had worsening tingling sensation in her 

bilateral legs, worse on the left.  She notes that sensation overall is getting 

better and strength is improving.  She wants to avoid medications in general 

when possible.  Denies any new shortness of breath or chest pain, no new 

numbness, tingling, or weakness.


Objective: 





 Vital Signs











Temp Pulse Resp BP Pulse Ox


 


 36.6 C   60   16   137/83 H  91 L


 


 01/17/19 06:11  01/17/19 06:11  01/17/19 06:11  01/17/19 06:11  01/17/19 06:11








 











 01/16/19 01/17/19 01/18/19





 05:59 05:59 05:59


 


Intake Total 640 1220 


 


Output Total 650 1151 


 


Balance -10 69 














Physical Exam





- Physical Exam


General Appearance: WD/WN, alert, no apparent distress, thin


EENT: No scleral icterus (R), No scleral icterus (L)


Respiratory: No respiratory distress, No accessory muscle use


Cardiac/Chest: normal peripheral pulses, regular rate, rhythm, No edema


Skin: normal color, warm/dry, No cyanosis, No diaphoresis


Extremities: other (Examination of the left leg showed no swelling, no redness, 

no joint tenderness around the knee, no crepitus.)


Neuro/Psych: alert, normal mood/affect, motor weakness (Less than 3 in left-

sided knee extension, 3 on the right-sided knee extension.  She has movement in 

both feet.), sensory deficit (Decreased sensation in bilateral lower limbs)





ICD10 Worksheet


Patient Problems: 


 Problems











Problem Status Onset


 


Bilateral leg weakness Acute

## 2019-01-18 RX ADMIN — ENOXAPARIN SODIUM SCH MG: 100 INJECTION SUBCUTANEOUS at 17:55

## 2019-01-18 RX ADMIN — ACETAMINOPHEN PRN MG: 325 TABLET ORAL at 20:21

## 2019-01-18 NOTE — SOAPPROG
SOAP Progress Note


Assessment/Plan: 


Assessment:





Polyneuropathy with lower extremity weakness, status post treatment with IVIG 

and improvement.  She has had some return of strength as well as full return of 

reflexes.  Reviewing notes from her 2 hospitalizations, she was previously 

found to be areflexic.  


* Initial functional independence measure is 83 on 1/18/2019.  Therapies of 

finding her left lower extremity to be stronger than the right.  She has 

hyperalgesia and dysesthesia.  She requires moderate assistance for bed 

mobility to get her legs into bed.  Transfers are done with contact guard 

assist.  She ambulated 70 ft with a front wheeled walker and contact guard 

assist, limited by fatigue.  She leans heavily on her arms on the walker.  

Upper body dressing was done with supervision, lower body dressing required 

minimal to moderate assist.  Grooming and hygiene were done seated with standby 

assist.  Shower transfer and bathing were done with minimal assist.  


* Continue PT and OT to optimize mobility and activities of daily living.





Weight loss with muscle wasting.  Management per dietitian.  Good appetite.





Lymphedema.  Previously treated with furosemide.  May be worsened by need for 

prednisone.  Echocardiogram ruled out congestive heart failure as a contributor 

to the lymphedema.  As she begins to spend more time upright, she will be 

monitored for recurrence.  Using NEISHA hose.  Consider diuresis; she was 

previously on furosemide.





Elevated blood pressure on admission to inpatient rehabilitation.  Will follow 

her vitals and consider treatment if blood pressure remains significantly 

elevated.  Blood pressure may be elevated due to prednisone treatment.  

Prednisone is to continue until follow-up with Neurology after discharge.


* Not hypertensive on 1/18/2019.





History of melanoma.  No longer on nivolumab.  She will follow up with her 

primary oncologist, Dr. Carmina Mcdaniels.





Prophylaxis.  With a solid cancer and immobility and age, she is at high risk 

for venous thrombosis.  Continue enoxaparin and SCDs at night.  Will discuss 

the need for SCDs if she is unable to tolerate them and sleep.





DISPOSITION:  Attended staffing, 15 min.  Discussed with case management, 

dietitian, nursing, PT, OT.  She lives alone but has a local son is available 

to help.  Goal for independent or modified independent function for return to 

home.  Discharge date set for 1/28/2019.





Followup.  She will follow up with her primary oncologist, Dr. Carmina Mcdaniels, and 

she will follow up with neurologist, Dr. Greene with [facility], after 

her discharge.








01/18/19 13:16





Subjective: 





No complaints.  Reports increased sensation in her toes.  Continues to have 

some calf pain which she thinks is good and describes as muscular.  No cough or 

dyspnea, no fevers or chills.


Objective: 





 Vital Signs











Temp Pulse Resp BP Pulse Ox


 


 36.5 C   95   16   113/80   92 


 


 01/18/19 08:00  01/18/19 08:00  01/18/19 08:00  01/18/19 08:00  01/18/19 08:00








 











 01/17/19 01/18/19 01/19/19





 05:59 05:59 05:59


 


Intake Total 1220 1240 


 


Output Total 1151  500


 


Balance 69 1240 -500














- Time Spent With Patient


Time Spent With Patient: 





Greater than 35 min floor time today, including more than 50% of time in 

coordination of care during staffing, and counseling patient.





Physical Exam





- Physical Exam


General Appearance: WD/WN, alert, no apparent distress, thin


Respiratory: No respiratory distress, No accessory muscle use


Cardiac/Chest: edema (2+ left pretibial, 1+ right pretibial.)


Skin: normal color, warm/dry


Neuro/Psych: alert, normal mood/affect, oriented x 3





ICD10 Worksheet


Patient Problems: 


 Problems











Problem Status Onset


 


Bilateral leg weakness Acute

## 2019-01-19 RX ADMIN — ENOXAPARIN SODIUM SCH MG: 100 INJECTION SUBCUTANEOUS at 18:06

## 2019-01-19 RX ADMIN — ACETAMINOPHEN PRN MG: 325 TABLET ORAL at 21:05

## 2019-01-19 NOTE — SOAPPROG
SOAP Progress Note


Assessment/Plan: 


Assessment:








Polyneuropathy with lower extremity weakness, status post treatment with IVIG 

and improvement.  She has had some return of strength as well as full return of 

reflexes.  Reviewing notes from her 2 hospitalizations, she was previously 

found to be areflexic.  MILD WEAKNESS OF RIGHT ANKLE DORSIFLEXORS.  PATIENT 

INSTRUCTED TO PERFORM ANKLE DORSIFLEXION WHILE LYING IN BED 10 REPETITIONS PER 

HOUR WHILE AWAKE TO HELP FACILITATE RETURN OF PRETIBIAL STRENGTH.


* Initial functional independence measure is 83 on 1/18/2019.  Therapies of 

finding her left lower extremity to be stronger than the right.  She has 

hyperalgesia and dysesthesia.  She requires moderate assistance for bed 

mobility to get her legs into bed.  Transfers are done with contact guard 

assist.  She ambulated 70 ft with a front wheeled walker and contact guard 

assist, limited by fatigue.  She leans heavily on her arms on the walker.  

Upper body dressing was done with supervision, lower body dressing required 

minimal to moderate assist.  Grooming and hygiene were done seated with standby 

assist.  Shower transfer and bathing were done with minimal assist.  


* Continue PT and OT to optimize mobility and activities of daily living.





Weight loss with muscle wasting.  Management per dietitian.  Good appetite.





Lymphedema.  Previously treated with furosemide.  May be worsened by need for 

prednisone.  Echocardiogram ruled out congestive heart failure as a contributor 

to the lymphedema.  As she begins to spend more time upright, she will be 

monitored for recurrence.  Using NEISHA hose.  Consider diuresis; she was 

previously on furosemide.





Elevated blood pressure on admission to inpatient rehabilitation.  BLOOD 

PRESSURE 1/19 132/89.  WILL CONTINUE TO MONITOR FOR NOW.  SHE WAS NOTED NOT TO 

BE HYPERTENSIVE ON 01/18  Will follow her vitals and consider treatment if 

blood pressure remains significantly elevated.  Blood pressure may be elevated 

due to prednisone treatment.  Prednisone is to continue until follow-up with 

Neurology after discharge.


* Not hypertensive on 1/18/2019.





History of melanoma.  No longer on nivolumab.  She will follow up with her 

primary oncologist, Dr. Carmina Mcdaniels.





Prophylaxis.  With a solid cancer and immobility and age, she is at high risk 

for venous thrombosis.  Continue enoxaparin and SCDs at night.  Will discuss 

the need for SCDs if she is unable to tolerate them and sleep.





DISPOSITION:  Attended staffing, 15 min.  Discussed with case management, 

dietitian, nursing, PT, OT.  She lives alone but has a local son is available 

to help.  Goal for independent or modified independent function for return to 

home.  Discharge date set for 1/28/2019.





Followup.  She will follow up with her primary oncologist, Dr. Carmina Mcdaniels, and 

she will follow up with neurologist, Dr. Greene with [facility], after 

her discharge.























Plan:





01/19/19 11:48





Subjective: 





SHE REPORTS THAT HER LOWER EXTREMITY SENSORY DISTURBANCE TENDS TO WAX AND WANE.

  NO PARTICULAR AGGRAVATING FACTORS.  SHE DENIES IMPAIRED LOWER EXTREMITY 

PROPRIOCEPTION.  SHE REPORTS SOME LEFT KNEE SWELLING.


Objective: 





 Vital Signs











Temp Pulse Resp BP Pulse Ox


 


 36.6 C   63   16   132/89 H  92 


 


 01/19/19 05:41  01/19/19 08:00  01/19/19 05:41  01/19/19 08:00  01/19/19 05:41








 











 01/18/19 01/19/19 01/20/19





 05:59 05:59 05:59


 


Intake Total 1240 2096 240


 


Output Total  4450 300


 


Balance 1240 -2354 -60














Physical Exam





- Physical Exam


General Appearance: WD/WN, alert, no apparent distress


Respiratory: lungs clear, normal breath sounds


Cardiac/Chest: No edema


Abdomen: normal bowel sounds, non-tender, soft, other (NO SUPRAPUBIC TENDERNESS)


Skin: normal color, warm/dry, other (NO SUDOMOTOR CHANGES RIGHT OR LEFT LOWER 

EXTREMITY)


Extremities: other (LOWER EXTREMITIES ARE TENDER TO PALPATION AND LIGHT TOUCH 

BUT NO DEFINITIVE ALLODYNIA.), No swelling, No Rema's sign


Neuro/Psych: normal mood/affect, motor weakness (MILD WEAKNESS OF THE RIGHT 

ANKLE DORSIFLEXORS.  PROPRIOCEPTION INTACT.)





ICD10 Worksheet


Patient Problems: 


 Problems











Problem Status Onset


 


Bilateral leg weakness Acute

## 2019-01-20 RX ADMIN — ENOXAPARIN SODIUM SCH MG: 100 INJECTION SUBCUTANEOUS at 17:36

## 2019-01-20 RX ADMIN — ACETAMINOPHEN PRN MG: 325 TABLET ORAL at 15:09

## 2019-01-20 RX ADMIN — ACETAMINOPHEN PRN MG: 325 TABLET ORAL at 09:00

## 2019-01-20 RX ADMIN — Medication SCH EA: at 10:57

## 2019-01-20 RX ADMIN — ACETAMINOPHEN PRN MG: 325 TABLET ORAL at 20:28

## 2019-01-20 NOTE — SOAPPROG
SOAP Progress Note


Assessment/Plan: 


Assessment:








Polyneuropathy with lower extremity weakness, status post treatment with IVIG 

and improvement.  She has had some return of strength as well as full return of 

reflexes.  Reviewing notes from her 2 hospitalizations, she was previously 

found to be areflexic.  PATIENT INSTRUCTED TO PERFORM SEATED AND SUPINE PLANTAR 

FLEXION STRENGTHENING EXERCISES BETWEEN THERAPY SESSIONS.  MILD WEAKNESS OF 

RIGHT ANKLE DORSIFLEXORS.  PATIENT INSTRUCTED TO PERFORM ANKLE DORSIFLEXION 

WHILE LYING IN BED 10 REPETITIONS PER HOUR WHILE AWAKE TO HELP FACILITATE 

RETURN OF PRETIBIAL STRENGTH.


* Initial functional independence measure is 83 on 1/18/2019.  Therapies of 

finding her left lower extremity to be stronger than the right.  She has 

hyperalgesia and dysesthesia.  She requires moderate assistance for bed 

mobility to get her legs into bed.  Transfers are done with contact guard 

assist.  She ambulated 70 ft with a front wheeled walker and contact guard 

assist, limited by fatigue.  She leans heavily on her arms on the walker.  

Upper body dressing was done with supervision, lower body dressing required 

minimal to moderate assist.  Grooming and hygiene were done seated with standby 

assist.  Shower transfer and bathing were done with minimal assist.  


* Continue PT and OT to optimize mobility and activities of daily living.  NOW 

WALKING 150 FT WITH MINIMAL SUPERVISION.





NEUROPATHIC PAIN-BILATERAL LEGS AND FEET.  PATIENT DOES NOT WANT TO TAKE 

GABAPENTIN.  SHE WAS ADVISED TO TAKE TYLENOL AT 7:00 A.M. BEFORE HER 1ST 

THERAPY SESSION AND THEN 3 HR LATER TO MINIMIZE POST THERAPY LOWER EXTREMITY 

PAIN.





Weight loss with muscle wasting.  Management per dietitian.  Good appetite.





Lymphedema.  STABLE.  Previously treated with furosemide.  May be worsened by 

need for prednisone.  Echocardiogram ruled out congestive heart failure as a 

contributor to the lymphedema.  As she begins to spend more time upright, she 

will be monitored for recurrence.  Using NEISHA hose.  Consider diuresis; she was 

previously on furosemide.





Elevated blood pressure on admission to inpatient rehabilitation.  BLOOD 

PRESSURE ON 01/20 130/80.  WILL CONTINUE TO MONITOR FOR NOW.  SHE WAS NOTED NOT 

TO BE HYPERTENSIVE ON 01/18  Will follow her vitals and consider treatment if 

blood pressure remains significantly elevated.  Blood pressure may be elevated 

due to prednisone treatment.  Prednisone is to continue until follow-up with 

Neurology after discharge.


* Not hypertensive on 1/18/2019.





History of melanoma.  No longer on nivolumab.  She will follow up with her 

primary oncologist, Dr. Carmina Mcdaniels.





Prophylaxis.  With a solid cancer and immobility and age, she is at high risk 

for venous thrombosis.  Continue enoxaparin and SCDs at night.  Will discuss 

the need for SCDs if she is unable to tolerate them and sleep.





DISPOSITION:  Attended staffing, 15 min.  Discussed with case management, 

dietitian, nursing, PT, OT.  She lives alone but has a local son is available 

to help.  Goal for independent or modified independent function for return to 

home.  Discharge date set for 1/28/2019.





Followup.  She will follow up with her primary oncologist, Dr. Carmina Mcdaniels, and 

she will follow up with neurologist, Dr. Greene with [facility], after 

her discharge.























Plan:





01/19/19 11:48





01/20/19 09:24





Subjective: 





SHE HAS A LOT OF TENDERNESS IN HER LEGS PARTICULARLY THE PRETIBIAL REGION AND 

CALF REGION WHICH SHE DESCRIBES AS SORENESS.  SHE CONTINUES TO HAVE SOME 

NUMBNESS AND TINGLING IN BOTH LEGS BELOW THE KNEE.  SHE STATES THAT SHE DOES 

NOT WANT TO TAKE THE GABAPENTIN.  OCCUPATIONAL THERAPY REPORTS THAT SHE HAS 

MADE A LOT OF FUNCTIONAL GAIN SINCE YESTERDAY.


Objective: 





 Vital Signs











Temp Pulse Resp BP Pulse Ox


 


 36.6 C   63   16   130/80 H  92 


 


 01/20/19 08:45  01/20/19 08:45  01/20/19 08:45  01/20/19 08:45  01/20/19 08:45








 











 01/19/19 01/20/19 01/21/19





 05:59 05:59 05:59


 


Intake Total 2096 1580 


 


Output Total 4450 1100 


 


Balance -2354 480 














Physical Exam





- Physical Exam


General Appearance: WD/WN, alert, mild distress, other (MILD DISTRESS SECONDARY 

TO LOWER EXTREMITY PAIN.)


Respiratory: lungs clear, normal breath sounds


Abdomen: non-tender, soft, other


Skin: normal color, warm/dry


Extremities: other (SIGNIFICANT TENDERNESS ALONG RIGHT AND LEFT TIBIAL RIDGE, 

PRETIBIAL REGION AND CALF MUSCLES.), No swelling, No Rema's sign


Neuro/Psych: alert, normal mood/affect, oriented x 3 (TEARFUL SECONDARY TO 

LOWER EXTREMITY DISCOMFORT ), motor weakness (MILD LOWER EXTREMITY WEAKNESS)





ICD10 Worksheet


Patient Problems: 


 Problems











Problem Status Onset


 


Bilateral leg weakness Acute

## 2019-01-21 RX ADMIN — Medication SCH EA: at 13:06

## 2019-01-21 RX ADMIN — ACETAMINOPHEN PRN MG: 325 TABLET ORAL at 19:54

## 2019-01-21 RX ADMIN — ENOXAPARIN SODIUM SCH MG: 100 INJECTION SUBCUTANEOUS at 17:44

## 2019-01-21 NOTE — SOAPPROG
SOAP Progress Note


Assessment/Plan: 


Assessment:








Polyneuropathy with lower extremity weakness, status post treatment with IVIG 

and improvement.  She has had some return of strength as well as full return of 

reflexes.  Reviewing notes from her 2 hospitalizations, she was previously 

found to be areflexic.  PATIENT INSTRUCTED TO PERFORM SEATED AND SUPINE PLANTAR 

FLEXION STRENGTHENING EXERCISES BETWEEN THERAPY SESSIONS.  MILD WEAKNESS OF 

RIGHT ANKLE DORSIFLEXORS.  PATIENT INSTRUCTED TO PERFORM ANKLE DORSIFLEXION 

WHILE LYING IN BED 10 REPETITIONS PER HOUR WHILE AWAKE TO HELP FACILITATE 

RETURN OF PRETIBIAL STRENGTH.


* Initial functional independence measure is 83 on 1/18/2019.  Therapies of 

finding her left lower extremity to be stronger than the right.  She has 

hyperalgesia and dysesthesia.  She requires moderate assistance for bed 

mobility to get her legs into bed.  Transfers are done with contact guard 

assist.  She ambulated 70 ft with a front wheeled walker and contact guard 

assist, limited by fatigue.  She leans heavily on her arms on the walker.  

Upper body dressing was done with supervision, lower body dressing required 

minimal to moderate assist.  Grooming and hygiene were done seated with standby 

assist.  Shower transfer and bathing were done with minimal assist.  


* Continue PT and OT to optimize mobility and activities of daily living.  NOW 

WALKING 150 FT WITH MINIMAL SUPERVISION.





NEUROPATHIC PAIN-BILATERAL LEGS AND FEET.  PATIENT DOES NOT WANT TO TAKE 

GABAPENTIN.  SHE WAS ADVISED TO TAKE TYLENOL AT 7:00 A.M. BEFORE HER 1ST 

THERAPY SESSION AND THEN 3 HR LATER TO MINIMIZE POST THERAPY LOWER EXTREMITY 

PAIN.





MELANOMA-REGARDING HER CURRENT PREDNISONE DOSAGE OF 60 MG, I HAVE ATTEMPTED TO 

CALL HER COLLEGES, DR. LORA ROMANO -[phone] WAS INFORMED THAT SHE WILL 

NOT RETURN UNTIL WEDNESDAY.  I WILL ASK SATISH ORTIZ, NURSE  ON THE 

FLOOR TO SEE IF SHE CAN CONTACT DR. CAMPA, HER NEUROLOGIST, TO SEE IF HE CAN'T 

COMMENT ON HER CURRENT PREDNISONE DOSAGE.  WILL DISCUSS WITH PATIENT THAT DR. ROMANO DOES NOT RETURN UNTIL WEDNESDAY.  History of melanoma.  No longer on 

nivolumab.  She will follow up with her primary oncologist, Dr. Lora Romano.





Weight loss with muscle wasting.  Management per dietitian.  Good appetite.





Lymphedema.  STABLE.  Previously treated with furosemide.  May be worsened by 

need for prednisone.  Echocardiogram ruled out congestive heart failure as a 

contributor to the lymphedema.  As she begins to spend more time upright, she 

will be monitored for recurrence.  Using NEISHA hose.  Consider diuresis; she was 

previously on furosemide.





Elevated blood pressure on admission to inpatient rehabilitation.  BLOOD 

PRESSURE ON 01/20 130/80.  WILL CONTINUE TO MONITOR FOR NOW.  SHE WAS NOTED NOT 

TO BE HYPERTENSIVE ON 01/18  Will follow her vitals and consider treatment if 

blood pressure remains significantly elevated.  Blood pressure may be elevated 

due to prednisone treatment.  Prednisone is to continue until follow-up with 

Neurology after discharge.  BLOOD PRESSURE THIS MORNING 142/91.  WILL ASK 

NURSING TO RECHECK.








Prophylaxis.  With a solid cancer and immobility and age, she is at high risk 

for venous thrombosis.  Continue enoxaparin and SCDs at night.  Will discuss 

the need for SCDs if she is unable to tolerate them and sleep.





DISPOSITION:  Attended staffing, 15 min.  Discussed with case management, 

dietitian, nursing, PT, OT.  She lives alone but has a local son is available 

to help.  Goal for independent or modified independent function for return to 

home.  Discharge date set for 1/28/2019.





Followup.  She will follow up with her primary oncologist, Dr. Lora Romano, and 

she will follow up with neurologist, Dr. Greene with [facility], after 

her discharge.























Plan:





01/19/19 11:48





01/20/19 09:24





01/21/19 08:44





Subjective: 





PATIENT ISN'T MAIN CONCERN THIS MORNING IS THAT HER CURRENT DOSE OF PREDNISONE 

60 MG SHOULD BE DECREASED TO 40 MG. SHE HAS INDICATED TO NURSING STAFF THIS 

MORNING THAT SHE HAS BEEN TELLING MULTIPLE PEOPLE ABOUT THIS DOSAGE CHANGE.  I 

INFORMED THE PATIENT, AS WELL AS NURSING STAFF THAT THIS IS THE 1ST TIME 

HEARING OF THIS; THERE WAS NO COMMENT REGARDING THIS IN THE WEEKEND CHECK OUT 

INFORMATION THAT I RECEIVED FROM DR. BRICEÑO AND THERE IS NOTHING IN DR. BRICEÑO 

IS H&P OR PROGRESS NOTE REGARDING THIS.  I ATTEMPTED TO CONTACT THE PATIENT'S 

ONCOLOGIST, DR. LORA ROMANO -[phone], HOWEVER SHE WILL NOT BE RETURNING 

UNTIL WEDNESDAY.  THE PATIENT ALSO STATES THAT HER NEUROLOGIST DR. CAMPA SHOULD 

ALSO BE CONTACTED AND I WILL ATTEMPT THIS IS AN IS A HAVE A PHONE NUMBER.  SHE 

HAS NO OTHER COMPLAINTS THIS MORNING.  SHE DOES NOT REPORT LOWER EXTREMITY PAIN 

TO THE EXTENT THAT SHE DID YESTERDAY.


Objective: 





 Vital Signs











Temp Pulse Resp BP Pulse Ox


 


 36.6 C   71   18   142/91 H  92 


 


 01/21/19 05:32  01/21/19 05:32  01/21/19 05:32  01/21/19 05:32  01/21/19 05:32








 











 01/20/19 01/21/19 01/22/19





 05:59 05:59 05:59


 


Intake Total 1580 1100 


 


Output Total 1100  


 


Balance 480 1100 














Physical Exam





- Physical Exam


General Appearance: WD/WN, alert, mild distress (MILD DISTRESS REGARDING 

PREDNISONE DOSAGE)


Respiratory: lungs clear, normal breath sounds


Abdomen: non-tender, soft


Skin: warm/dry


Neuro/Psych: motor weakness (2/5 RIGHT AND LEFT HIP FLEXORS, ABDUCTORS, 

ADDUCTORS, QUADRICEPS, TIBIALIS ANTERIOR, ANKLE PLANTAR FLEXORS BILATERALLY.  

NO ALLODYNIA.), other (LEGS LESS TENDER TO PALPATION IN HER CALVES AND 

PRETIBIAL RIB AREA.  NO EDEMA.)





ICD10 Worksheet


Patient Problems: 


 Problems











Problem Status Onset


 


Bilateral leg weakness Acute

## 2019-01-22 RX ADMIN — ENOXAPARIN SODIUM SCH MG: 100 INJECTION SUBCUTANEOUS at 16:54

## 2019-01-22 RX ADMIN — Medication SCH EA: at 08:30

## 2019-01-22 RX ADMIN — ACETAMINOPHEN PRN MG: 325 TABLET ORAL at 20:30

## 2019-01-22 RX ADMIN — ACETAMINOPHEN PRN MG: 325 TABLET ORAL at 16:57

## 2019-01-22 NOTE — SOAPPROG
SOAP Progress Note


Assessment/Plan: 


68-year-old woman with acute axonal polyneuropathy of unclear etiology but 

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 

improvement, impairments in mobility, self-care, decreased strength and 

sensation.





Today's update:  Doing well from a symptomatic standpoint, continuing to make 

progress in therapies.  Improving neurological times overall.  Continuing 

rehabilitation plan.





A total of 15 min was spent on the floor in the care of the patient, the 

majority of which was spent in counseling coordination of care regarding pain 

management strategies and coordination of rehabilitation activities.





Acute axonal polyneuropathy:  Status post IVIG, thought related to treatment 

for melanoma.  Impairments in mobility, self-care.  Improving.


* Continue PT, OT for these impairments listed above.  Making progress


* No additional workup recommended for neuropathy at this point, continue to 

monitor


* Prednisone, currently 40 mg daily


* Gabapentin available for neuropathic pain symptoms, but she is not interested 

in taking it at this point.





Melanoma:  Previously on nivolumab, not currently. 


* Follow-up with Oncology after discharge, no current treatment on inpatient 

rehabilitation


* Therapy for lymphedema, consider diuresis if needed with furosemide


* Weight loss, dietician, monitor





Hypertension:  Primary, has been stable


* Monitor, no pharmacologic therapy for now


* Follow-up with PCP





Prophylaxis:  Enoxaparin subcutaneous for DVT prophylaxis





Follow-up with PCP, oncologist, Neurology.





01/17/19 09:11





01/22/19 14:16





Subjective: 





Chief complaint:  Rehabilitation progress





No acute events overnight.  Patient denies any new shortness of breath or chest 

pain, no new numbness, tingling, or weakness.  She feels are no barriers to 

participation in rehabilitation.  She also feels that neurologically she is 

improving with new sensation in the posterior aspect of her knees, improving 

overall strength and sensation.  She has no particular concerns today.


Objective: 





 Vital Signs











Temp Pulse Resp BP Pulse Ox


 


 36.3 C   68   16   147/91 H  92 


 


 01/22/19 06:15  01/22/19 06:15  01/22/19 06:15  01/22/19 06:15  01/22/19 06:15








 











 01/21/19 01/22/19 01/23/19





 05:59 05:59 05:59


 


Intake Total 1100 1730 


 


Balance 1100 1730 














Physical Exam





- Physical Exam


General Appearance: WD/WN, alert, no apparent distress


EENT: No scleral icterus (R), No scleral icterus (L)


Respiratory: No respiratory distress, No accessory muscle use


Cardiac/Chest: normal peripheral pulses, regular rate, rhythm, edema (Trace 

bilateral lower extremity)


Skin: normal color, warm/dry, No cyanosis


Neuro/Psych: alert, normal mood/affect, other (Improve sensation in the 

posterior aspect of her knees were she noted improvement, normal compared to 

arms and face in light touch.)





ICD10 Worksheet


Patient Problems: 


 Problems











Problem Status Onset


 


Bilateral leg weakness Acute

## 2019-01-23 RX ADMIN — ACETAMINOPHEN PRN MG: 325 TABLET ORAL at 21:02

## 2019-01-23 RX ADMIN — ACETAMINOPHEN PRN MG: 325 TABLET ORAL at 02:30

## 2019-01-23 RX ADMIN — Medication SCH EA: at 08:19

## 2019-01-23 RX ADMIN — ACETAMINOPHEN PRN MG: 325 TABLET ORAL at 08:20

## 2019-01-23 RX ADMIN — ENOXAPARIN SODIUM SCH MG: 100 INJECTION SUBCUTANEOUS at 17:54

## 2019-01-23 RX ADMIN — ACETAMINOPHEN PRN MG: 325 TABLET ORAL at 14:45

## 2019-01-23 NOTE — SOAPPROG
SOAP Progress Note


Assessment/Plan: 


Assessment:





Polyneuropathy with lower extremity weakness, status post treatment with IVIG 

and improvement.  She has had some return of strength as well as full return of 

reflexes.  Reviewing notes from her 2 hospitalizations, she was previously 

found to be areflexic.  


* Initial functional independence measure is 83 on 1/18/2019, improved to 93 as 

of 1/23/2019.  Functionally inconsistent.  Minimal assist for bed mobility sit 

to supine, using reacher to help raise legs.  Transfers vary from minimal 

assist to independent; worse with fatigue.  Ambulated 150 ft with standby 

assist.  Climbs and descends 4 steps with minimal to moderate assistance.  Most 

ADLs standby assist but bath transfer requires moderate assist.  She does 

grooming and hygiene seated with modified independence.


* Continue PT and OT to optimize mobility and activities of daily living.





Weight loss with muscle wasting.  Management per dietitian.  Good appetite.





Lymphedema.  Previously treated with furosemide.  May be worsened by need for 

prednisone.  Echocardiogram ruled out congestive heart failure as a contributor 

to the lymphedema.  As she begins to spend more time upright, she will be 

monitored for recurrence.  Using NEISHA hose.  Consider diuresis; she was 

previously on furosemide.





Elevated blood pressure on admission to inpatient rehabilitation.  Will follow 

her vitals and consider treatment if blood pressure remains significantly 

elevated.  Blood pressure may be elevated due to prednisone treatment.  

Prednisone is to continue until follow-up with Neurology after discharge.


* Not hypertensive on 1/18/2019.





History of melanoma.  No longer on nivolumab.  She will follow up with her 

primary oncologist, Dr. Carmina Mcdaniels.





Prophylaxis.  With a solid cancer and immobility and age, she is at high risk 

for venous thrombosis.  Continue enoxaparin and SCDs at night.  





DISPOSITION:  Attended staffing, 15 min.  Discussed with case management, 

dietitian, nursing, PT, OT.  She lives alone but has a local son is available 

to help.  Goal for independent or modified independent function for return to 

home.  Discharge date set for 2/1/2019.





Followup.  She will follow up with her primary oncologist, Dr. Carmina Mcdaniels, and 

she will follow up with neurologist, Dr. Greene with [facility], after 

her discharge.








01/23/19 11:59





Subjective: 





Reports poor sleep last night.  She had a massage yesterday and subsequently 

had increased sensation in her legs.  She also had issues with dry mouth, 

urinary frequency, and feeling the room was too hot.  Otherwise without 

complaints.  Nurse recorded heart rate of 145 this morning but she denies 

feeling palpitations.


Objective: 





 Vital Signs











Temp Pulse Resp BP Pulse Ox


 


 36.7 C   145 H  22 H  145/89 H  92 


 


 01/23/19 05:58  01/23/19 05:58  01/23/19 05:58  01/23/19 05:58  01/23/19 05:58








 











 01/22/19 01/23/19 01/24/19





 05:59 05:59 05:59


 


Intake Total 1730 340 


 


Balance 1730 340 














- Time Spent With Patient


Time Spent With Patient: 





Greater than 35 min floor time today including more than 50% of time in 

coordination of care during staffing meeting, and counseling patient.





Physical Exam





- Physical Exam


General Appearance: WD/WN, alert, no apparent distress, thin


Respiratory: normal breath sounds, No crackles, No rhonchi, No wheezing


Cardiac/Chest: regular rate, rhythm, edema (1 plus left pretibial, trace to 1+ 

right pretibial.), No JVD, No diastolic murmur, No systolic murmur


Skin: normal color, warm/dry


Neuro/Psych: alert, normal mood/affect, oriented x 3





ICD10 Worksheet


Patient Problems: 


 Problems











Problem Status Onset


 


Bilateral leg weakness Acute

## 2019-01-24 RX ADMIN — ACETAMINOPHEN PRN MG: 325 TABLET ORAL at 09:06

## 2019-01-24 RX ADMIN — ACETAMINOPHEN PRN MG: 325 TABLET ORAL at 15:19

## 2019-01-24 RX ADMIN — Medication SCH EA: at 09:04

## 2019-01-24 RX ADMIN — ACETAMINOPHEN PRN MG: 325 TABLET ORAL at 20:21

## 2019-01-25 VITALS — DIASTOLIC BLOOD PRESSURE: 78 MMHG | SYSTOLIC BLOOD PRESSURE: 127 MMHG

## 2019-01-25 RX ADMIN — Medication SCH EA: at 08:26

## 2019-01-25 RX ADMIN — ACETAMINOPHEN PRN MG: 325 TABLET ORAL at 06:04

## 2019-01-25 NOTE — PDOREHIP
Admission [facility]





- Admission - 3 Day Assessment Period


Admission Date/Day 1: 01/15/19


Day 2: 01/16/19


Day 3: 01/17/19





- Active Diagnoses


Comorbidities and Co-existing Conditions at Admission: 23529. None of the Above





Discharge [facility]





- Discharge  - 3 Day Assessment Period


2 Days Prior to Anticipated Discharge Date: 01/23/19


1 Day Prior to Anticipated Discharge Date: 01/24/19


Anticipated Discharge Date: 01/25/19





- Discharge Skin Conditions


Unhealed Pressure Ulcer (1 or more/Stage 1 or >)-Discharge: 0. No


# Stage 1 Pressure Ulcers-Discharge: 0


# Stage 2 Pressure Ulcers-Discharge: 0


# of These Stage 2 Pressure Ulcers Present on Admission: 0


# Stage 3 Pressure Ulcers-Discharge: 0


# of These Stage 3 Pressure Ulcers Present on Admission: 0


# Stage 4 Pressure Ulcers-Discharge: 0


# of These Stage 4 Pressure Ulcers Present on Admission: 0


# Unstageable Pressure Ulcers (Non-remove Dress)-Discharge: 0


# These Unstageable Pressure Ulcers (NRD)-Present on Admit: 0


# Unstageable Pressure Ulcers (Slough/Eschar)-Discharge: 0


# These Unstageable Pressure Ulcers(Slough) Present on Admit: 0


# Unstageable Pressure Ulcers (Deep Tissue Injury)-Discharge: 0


# These Unstageable Pressure Ulcers (DTI) Present on Admit: 0

## 2019-01-25 NOTE — SOAPPROG
SOAP Progress Note


Assessment/Plan: 


Assessment:





Polyneuropathy with lower extremity weakness, status post treatment with IVIG 

and improvement.  She has had some return of strength as well as full return of 

reflexes.  She is on a prednisone taper, and will discharge on prednisone 40 mg 

q.day.


* Initial functional independence measure is 83 on 1/18/2019, improved to 93 as 

of 1/23/2019.  Functionally inconsistent.  Minimal assist for bed mobility sit 

to supine, using reacher to help raise legs.  Transfers vary from minimal 

assist to independent; worse with fatigue.  Ambulated 150 ft with standby 

assist.  Climbs and descends 4 steps with minimal to moderate assistance.  Most 

ADLs standby assist but bath transfer requires moderate assist.  She does 

grooming and hygiene seated with modified independence.


* Advanced to independent in her room as of 1/24/2019.


* Continue PT and OT to optimize mobility and activities of daily living.





Weight loss with muscle wasting.  Management per dietitian.  Good appetite.





Lymphedema.  Previously treated with furosemide.  May be worsened by need for 

prednisone.  Echocardiogram ruled out congestive heart failure as a contributor 

to the lymphedema.  As she begins to spend more time upright, she will be 

monitored for recurrence.  Using NEISHA hose.  Consider diuresis; she was 

previously on furosemide.


* Continue compression stocking





Elevated blood pressure on admission to inpatient rehabilitation.  Mostly 

resolved.  Prednisone may contribute.  No indication for antihypertensives.





History of melanoma.  No longer on nivolumab.  She will follow up with her 

primary oncologist, Dr. Carmina Mcdaniels.





Prophylaxis.  Mobility is much improved, walking to meals and walking 150 ft or 

further 3 times a day.  Discontinued enoxaparin starting 1/25/2019.





DISPOSITION:  She lives alone but has a local son is available to help.  Goal 

for independent or modified independent function for return to home.  Discharge 

date set for 2/1/2019.





Followup.  She will follow up with her primary oncologist, Dr. Carmina Mcdaniels, and 

she will follow up with neurologist, Dr. Greene with [facility], after 

her discharge.








01/25/19 12:25





Subjective: 





No complaints.  Continues to have tingling in the legs but does not desire 

medication beyond p.r.n. acetaminophen.


Objective: 





 Vital Signs











Temp Pulse Resp BP Pulse Ox


 


 36.6 C   67   16   127/78 H  93 


 


 01/25/19 06:14  01/25/19 06:14  01/25/19 06:14  01/25/19 06:14  01/25/19 06:14








 











 01/24/19 01/25/19 01/26/19





 05:59 05:59 05:59


 


Intake Total 690 1640 500


 


Balance 690 1640 500














Physical Exam





- Physical Exam


General Appearance: WD/WN, alert, no apparent distress


Respiratory: normal breath sounds, No crackles, No rhonchi, No wheezing


Cardiac/Chest: regular rate, rhythm, edema (1 to 2+ on left, trace to 1+ on 

right, pretibial), No diastolic murmur, No systolic murmur


Skin: normal color, warm/dry


Neuro/Psych: alert, normal mood/affect, oriented x 3, abnormal gait (Tends to 

keep legs straight.  Step through pattern using front wheeled walker.)





ICD10 Worksheet


Patient Problems: 


 Problems











Problem Status Onset


 


Bilateral leg weakness Acute

## 2019-01-25 NOTE — GDS
ADMITTING DIAGNOSIS:  Polyneuropathy.



DISCHARGE DIAGNOSIS:  Polyneuropathy.



OTHER DISCHARGE DIAGNOSIS:  Lymphedema.



COMPLICATIONS:  There were none.



PROCEDURES:  There were none.



CONSULTATIONS:  There were none.



HISTORY AND HOSPITAL COURSE:  This patient was admitted from .  [facility] 
progression of weakness and numbness of the lower extremities over 
approximately a 2 week period.  Symptoms had begun much more gradually for 
approximately 6 weeks prior to admission. She was diagnosed with a 
polyneuropathy and transferred to  where EMG and nerve 
conduction studies could be done. There studies were consistent with more 
likely axonal than demyelinating polyneuropathy.  She was treated with IVIG and 
continued on high dose of steroids.  She had gradual improvement in her 
function to the point where she was ready for inpatient rehabilitation. 



She did well in rehabilitation.  Her initial functional independence measure 
was 83 on 01/18/2019, which is consistent with a low level of assisted living 
function.  This improved to 93 as of 01/23/2019, which is still consistent with 
assisted living, but approaching independent function.  However, her functional 
status was inconsistent. Transfers varied from minimal assist to independent 
but were considerably worse with fatigue. She was able to ambulate 150 feet 
with standby assist and a front-wheeled walker, though she had fatigue at the 
end.  She was able to climb and descend 4 steps with minimal to moderate 
assistance.  Regarding other activities of daily living, she was mostly at 
standby assist but bath transfer required moderate assist.  She was advanced to 
independent in her room on 01/24/2019. 



She had weight loss during her period of gradual decline and she had muscle 
wasting.  She was evaluated by the dietitian for adequacy of nutrition to 
regain muscle. 



She had chronic lymphedema.  This was due to a history of lymph node resection 
in the left groin for metastatic melanoma.  She was treated with compression 
stockings. 



Melanoma was previously treated with nivolumab.  This was discontinued during 
her acute hospitalization as it was possible that nivolumab had caused her 
polyneuropathy, though more typically neuropathy from nivolumab is 
demyelinating in nature. 



On the morning of discharge, she had authorization verbally from her insurance 
company to continue her stay through February 1, but in the afternoon a fax was 
received indicating that extension of her rehabilitation stay had been denied.  
There was considerable concern regarding her safety at home as she has fatigue 
and fall risk.  However, ultimately it was arranged for her to discharge to her 
son's home through the weekend and if needed, to arrange a skilled nursing 
facility stay starting next week, which would be 01/28/2019.



DISCHARGE PLAN:  Condition upon discharge is good.



ACTIVITY:  Ad efrain but when fatigued she needs assistance with mobility related 
tasks.



DIET:  Regular.



ALLERGIES:  There are no known drug allergies, though she should not return to 
using nivolumab.



DISCHARGE MEDICATIONS:  

1.  Acetaminophen 650 mg p.o. q.6 hours p.r.n.

2.  Polyethylene glycol p.r.n.

3.  Senna p.r.n.

4.  Prednisone 40 mg p.o. daily.

5.  Vitamin B complex 1 p.o. daily.



ISSUES TO BE ADDRESSED AT FOLLOWUP:  

1.  Debility.  She will continue PT and OT at home and may be admitted to a 
skilled nursing facility subsequently. She can follow up with her primary care 
provider, Ruddy Mcdaniels, regarding her functional status.

2.  Polyneuropathy.  She will see Dr. Usama Greene with [facility].

3.  Metastatic melanoma.  She has followup scheduled with oncologist, Dr. Carmina Mcdaniels on 02/05/2019.  



Greater than 30 minutes were spent on this discharge, including extensive 
discussions with patient, Case Management and representatives of her insurance 
company regarding concern around safety of her discharge.  Also, medication 
reconciliation and coordination of care was accomplished as well as counseling 
of the patient.



Copy requested to:

Usama Greene M.D. 

[facility]



Job #:  [id number]

MTDD